# Patient Record
Sex: MALE | URBAN - METROPOLITAN AREA
[De-identification: names, ages, dates, MRNs, and addresses within clinical notes are randomized per-mention and may not be internally consistent; named-entity substitution may affect disease eponyms.]

---

## 2021-09-15 ENCOUNTER — APPOINTMENT (RX ONLY)
Dept: URBAN - METROPOLITAN AREA CLINIC 176 | Facility: CLINIC | Age: 41
Setting detail: DERMATOLOGY
End: 2021-09-15

## 2021-09-15 VITALS — HEIGHT: 65 IN | WEIGHT: 129 LBS

## 2021-09-15 DIAGNOSIS — L80 VITILIGO: ICD-10-CM

## 2021-09-15 PROBLEM — L81.9 DISORDER OF PIGMENTATION, UNSPECIFIED: Status: ACTIVE | Noted: 2021-09-15

## 2021-09-15 PROCEDURE — ? ADDITIONAL NOTES

## 2021-09-15 PROCEDURE — ? PRESCRIPTION

## 2021-09-15 PROCEDURE — ? COUNSELING

## 2021-09-15 PROCEDURE — 99203 OFFICE O/P NEW LOW 30 MIN: CPT

## 2021-09-15 RX ORDER — KETOCONAZOLE 20 MG/ML
SHAMPOO, SUSPENSION TOPICAL
Qty: 120 | Refills: 0 | Status: ERX | COMMUNITY
Start: 2021-09-15

## 2021-09-15 RX ORDER — PIMECROLIMUS 10 MG/G
CREAM TOPICAL BID
Qty: 30 | Refills: 3 | Status: ERX | COMMUNITY
Start: 2021-09-15

## 2021-09-15 RX ADMIN — KETOCONAZOLE: 20 SHAMPOO, SUSPENSION TOPICAL at 00:00

## 2021-09-15 RX ADMIN — PIMECROLIMUS: 10 CREAM TOPICAL at 00:00

## 2021-09-15 ASSESSMENT — LOCATION ZONE DERM: LOCATION ZONE: NECK

## 2021-09-15 ASSESSMENT — LOCATION DETAILED DESCRIPTION DERM
LOCATION DETAILED: LEFT SUPERIOR ANTERIOR NECK
LOCATION DETAILED: RIGHT SUPERIOR ANTERIOR NECK

## 2021-09-15 ASSESSMENT — LOCATION SIMPLE DESCRIPTION DERM
LOCATION SIMPLE: LEFT ANTERIOR NECK
LOCATION SIMPLE: RIGHT ANTERIOR NECK

## 2021-09-15 NOTE — PROCEDURE: ADDITIONAL NOTES
Detail Level: Simple
Render Risk Assessment In Note?: no
Additional Notes: Has been occurring over a few months, started as one spot and then began enlarging. Borders are ill defined, does not look like singh depigmentation but hypopigmentation. On woods lamp hard to appreciate if was bright enough to be vitiligo. Unsure if this could be TV ( although presentation today is not scaling or itching) so we will try the ketoconazole shampoo. Start with elidel in case vitiligo. \\nIF not showing some improvement in 6 weeks, we will consider adding a steroid into the mix and can consider XTRAC laser therapy twice weekly for up to 6 months. We can also add in a topical vitamin D calcipotriene too as an option. \\n\\Philip next visit ask about autoimmune disease and consider blood work for thyroid, ARCHIE and CBC.

## 2021-10-25 RX ORDER — PIMECROLIMUS 10 MG/G
CREAM TOPICAL BID
Qty: 30 | Refills: 3 | Status: ACTIVE

## 2021-10-25 RX ORDER — KETOCONAZOLE 20 MG/ML
SHAMPOO, SUSPENSION TOPICAL
Qty: 120 | Refills: 0 | Status: ACTIVE

## 2021-10-26 ENCOUNTER — APPOINTMENT (RX ONLY)
Dept: URBAN - METROPOLITAN AREA CLINIC 176 | Facility: CLINIC | Age: 41
Setting detail: DERMATOLOGY
End: 2021-10-26

## 2021-10-26 DIAGNOSIS — L80 VITILIGO: ICD-10-CM

## 2021-10-26 PROCEDURE — ? COUNSELING

## 2021-10-26 PROCEDURE — ? ADDITIONAL NOTES

## 2021-10-26 PROCEDURE — ? PRESCRIPTION

## 2021-10-26 ASSESSMENT — LOCATION DETAILED DESCRIPTION DERM
LOCATION DETAILED: LEFT SUPERIOR ANTERIOR NECK
LOCATION DETAILED: RIGHT SUPERIOR ANTERIOR NECK

## 2021-10-26 ASSESSMENT — LOCATION ZONE DERM: LOCATION ZONE: NECK

## 2021-10-26 ASSESSMENT — LOCATION SIMPLE DESCRIPTION DERM
LOCATION SIMPLE: RIGHT ANTERIOR NECK
LOCATION SIMPLE: LEFT ANTERIOR NECK